# Patient Record
Sex: FEMALE | Race: WHITE | URBAN - METROPOLITAN AREA
[De-identification: names, ages, dates, MRNs, and addresses within clinical notes are randomized per-mention and may not be internally consistent; named-entity substitution may affect disease eponyms.]

---

## 2017-01-31 ENCOUNTER — CARE COORDINATION (OUTPATIENT)
Dept: GERIATRIC MEDICINE | Facility: CLINIC | Age: 67
End: 2017-01-31

## 2017-01-31 NOTE — PROGRESS NOTES
1/31/17 MARISSA spoke with Annabelle to set up a home visit in February. Her next LTCC is due by March 2, 2017. Annabelle notified CM she no longer is seeing a [facility] doctor. [facility]. MARISSA notified Annabelle that CM will notify [facility] her clinic system has changed and she will get a new  either 2/1/17 or 3/1/17. The new CM will contact her and schedule her next home visit.  MARISSA notified Urvashi Chavez and Carin Vasquez to disenroll member  from [facility]. [facility] to be notified of dis enrollment from [facility] and [facility].  UTF filled out to transfer to next  when requested.  Case to be closed.  Dayanna Hayden RN BA PHN  [facility] Case Management  [phone]

## 2017-02-17 ENCOUNTER — TELEPHONE (OUTPATIENT)
Dept: FAMILY MEDICINE | Facility: CLINIC | Age: 67
End: 2017-02-17

## 2017-02-17 NOTE — TELEPHONE ENCOUNTER
Patient heard about this from a third party,she did not request it. She will contact them and ask them to remove her information. No Rx needed.

## 2017-02-17 NOTE — TELEPHONE ENCOUNTER
LIDOCAINE OINT NOT ON MED LIST      Last Written Prescription Date:  ?  Last Fill Quantity: ?,   # refills: 0  Last Office Visit with [facility], [facility] or  [employer] prescribing provider: 11-1-16  Future Office visit:       Routing refill request to provider for review/approval because:  Drug not on the [facility], [facility] or [employer] refill protocol or controlled substance

## 2017-02-17 NOTE — TELEPHONE ENCOUNTER
Im not sure what this lidocaine is for or who prescribed it. Pt should contact provider who gave this to them for a refill, or be re-seen. FLO Olea, FNP-BC

## 2017-04-04 DIAGNOSIS — G25.81 RESTLESS LEGS SYNDROME (RLS): ICD-10-CM

## 2017-04-04 RX ORDER — CLONAZEPAM 0.5 MG/1
0.5 TABLET ORAL
Qty: 90 TABLET | Refills: 0 | Status: SHIPPED | OUTPATIENT
Start: 2017-04-04 | End: 2017-06-26

## 2017-04-28 ENCOUNTER — TELEPHONE (OUTPATIENT)
Dept: FAMILY MEDICINE | Facility: CLINIC | Age: 67
End: 2017-04-28

## 2017-04-28 NOTE — TELEPHONE ENCOUNTER
4/28/2017    Call Regarding Preventive Health Screening Mammogram    Attempt 1    Message on voicemail     Comments:         Outreach   WILBER

## 2017-05-03 NOTE — TELEPHONE ENCOUNTER
5/3/2017    Call Regarding Preventive Health Screening Mammogram    Attempt 2    Message on voicemail     Comments:         Outreach   WILBER

## 2017-05-11 ENCOUNTER — RADIANT APPOINTMENT (OUTPATIENT)
Dept: MAMMOGRAPHY | Facility: CLINIC | Age: 67
End: 2017-05-11
Attending: PHYSICIAN ASSISTANT
Payer: COMMERCIAL

## 2017-05-11 DIAGNOSIS — Z12.31 VISIT FOR SCREENING MAMMOGRAM: ICD-10-CM

## 2017-05-11 PROCEDURE — G0202 SCR MAMMO BI INCL CAD: HCPCS | Mod: TC

## 2017-06-26 DIAGNOSIS — J31.0 CHRONIC RHINITIS, UNSPECIFIED TYPE: Primary | ICD-10-CM

## 2017-06-26 DIAGNOSIS — G25.81 RESTLESS LEGS SYNDROME (RLS): ICD-10-CM

## 2017-06-26 DIAGNOSIS — E78.5 HYPERLIPIDEMIA LDL GOAL <130: ICD-10-CM

## 2017-06-26 DIAGNOSIS — I10 HYPERTENSION, BENIGN ESSENTIAL, GOAL BELOW 140/90: ICD-10-CM

## 2017-06-26 NOTE — TELEPHONE ENCOUNTER
Routing refill request to provider for review/approval because:  Drug not on the [facility] refill protocol   Labs not current:    Due for follow up.  Pended for approval.  Ana Villanueva RN

## 2017-06-26 NOTE — TELEPHONE ENCOUNTER
LORATADINE      Last Written Prescription Date: 6-26-17  Last Fill Quantity: ?,  # refills: ?   Last Office Visit with [facility], [facility] or [employer] [employer] prescribing provider: 11-1-16                                             LOSARTAN      Last Written Prescription Date: 4-4-17  Last Fill Quantity: 90, # refills: 0  Last Office Visit with [facility], [facility] or [facility] [employer] prescribing provider: 11-1-16       Potassium   Date Value Ref Range Status   11/04/2015 4.2 3.4 - 5.3 mmol/L Final     Creatinine   Date Value Ref Range Status   11/04/2015 0.87 0.52 - 1.04 mg/dL Final     BP Readings from Last 3 Encounters:   11/01/16 148/73   11/01/16 115/69   06/05/16 140/80     LISINOPRIL-HCTZ      Last Written Prescription Date: 4-4-17  Last Fill Quantity: 90, # refills: 0  Last Office Visit with [facility], [facility] or [facility] [employer] prescribing provider: 11-1-16       Potassium   Date Value Ref Range Status   11/04/2015 4.2 3.4 - 5.3 mmol/L Final     Creatinine   Date Value Ref Range Status   11/04/2015 0.87 0.52 - 1.04 mg/dL Final     BP Readings from Last 3 Encounters:   11/01/16 148/73   11/01/16 115/69   06/05/16 140/80     ATORVASTATIN     Last Written Prescription Date: 4-4-17  Last Fill Quantity: 90, # refills: 2  Last Office Visit with [facility], [facility] or [facility] [employer] prescribing provider: 11-1-16       Lab Results   Component Value Date    CHOL 194 11/04/2015     Lab Results   Component Value Date    HDL 57 11/04/2015     Lab Results   Component Value Date    LDL 96 11/04/2015     Lab Results   Component Value Date    TRIG 206 11/04/2015     Lab Results   Component Value Date    CHOLHDLRATIO 3.4 11/04/2015       Clonazepam      Last Written Prescription Date:  04/04/17  Last Fill Quantity: 90,   # refills: 0  Last Office Visit with [facility], [facility] or [facility] prescribing provider: 11/01/16  Future Office visit:       Routing refill request to provider for review/approval because:  Drug not on the [facility], [facility] or [employer] refill protocol or controlled substance

## 2017-06-27 RX ORDER — LISINOPRIL/HYDROCHLOROTHIAZIDE 10-12.5 MG
TABLET ORAL
Qty: 30 TABLET | Refills: 0 | Status: SHIPPED | OUTPATIENT
Start: 2017-06-27

## 2017-06-27 RX ORDER — ATORVASTATIN CALCIUM 40 MG/1
TABLET, FILM COATED ORAL
Qty: 30 TABLET | Refills: 0 | Status: SHIPPED | OUTPATIENT
Start: 2017-06-27

## 2017-06-27 RX ORDER — CLONAZEPAM 0.5 MG/1
0.5 TABLET ORAL
Qty: 90 TABLET | Refills: 0 | Status: SHIPPED | OUTPATIENT
Start: 2017-06-27

## 2017-06-27 RX ORDER — LOSARTAN POTASSIUM 100 MG/1
TABLET ORAL
Qty: 30 TABLET | Refills: 0 | Status: SHIPPED | OUTPATIENT
Start: 2017-06-27

## 2017-06-27 NOTE — TELEPHONE ENCOUNTER
Hard copy of script for Clonazepam 0.5 mg tablet faxed to [facility] mail order pharmacy

## 2017-10-04 ENCOUNTER — TELEPHONE (OUTPATIENT)
Dept: GASTROENTEROLOGY | Facility: CLINIC | Age: 67
End: 2017-10-04

## 2017-10-04 DIAGNOSIS — Z85.048 PERSONAL HISTORY OF RECTAL CANCER: Primary | ICD-10-CM

## 2017-10-04 NOTE — TELEPHONE ENCOUNTER
Patient t due for one year follow up colonoscopy, Please place order for scheduling.  Maria Del Carmen Gallegos RN

## 2017-10-05 ENCOUNTER — TELEPHONE (OUTPATIENT)
Dept: GASTROENTEROLOGY | Facility: CLINIC | Age: 67
End: 2017-10-05

## 2017-12-02 ENCOUNTER — HEALTH MAINTENANCE LETTER (OUTPATIENT)
Age: 67
End: 2017-12-02

## 2020-06-11 NOTE — TELEPHONE ENCOUNTER
Clonazepam      Last Written Prescription Date:  03/05/17  Last Fill Quantity: 30,   # refills: 3  Last Office Visit with [facility] or  [facility] prescribing provider: 11/01/16  Future Office visit:       Routing refill request to provider for review/approval because:  Drug not on the [facility], [facility] or  [employer] refill protocol or controlled substance    
Hard copy of script faxed to [facility] mail order pharmacy. Patient is aware, she will call back to schedule appointment  
REFILLED X1, DUE FOR ANNUAL FOLLOW UP NEXT MONTH  Done, in my out basket.   
Admitted